# Patient Record
Sex: MALE | Race: WHITE | Employment: FULL TIME | URBAN - NONMETROPOLITAN AREA
[De-identification: names, ages, dates, MRNs, and addresses within clinical notes are randomized per-mention and may not be internally consistent; named-entity substitution may affect disease eponyms.]

---

## 2017-02-15 ENCOUNTER — OFFICE VISIT (OUTPATIENT)
Dept: FAMILY MEDICINE CLINIC | Facility: CLINIC | Age: 22
End: 2017-02-15

## 2017-02-15 VITALS — TEMPERATURE: 98 F | DIASTOLIC BLOOD PRESSURE: 70 MMHG | WEIGHT: 156 LBS | SYSTOLIC BLOOD PRESSURE: 122 MMHG

## 2017-02-15 DIAGNOSIS — N43.41 SPERMATOCELE OF EPIDIDYMIS, SINGLE: Primary | ICD-10-CM

## 2017-02-15 PROCEDURE — 99213 OFFICE O/P EST LOW 20 MIN: CPT | Performed by: FAMILY MEDICINE

## 2017-02-15 NOTE — PROGRESS NOTES
HPI:    Patient ID: Antony Green is a 24year old male. Patient presents with:  Testicular Swelling: right lump     HPI x 1 1/2 months c/o lump on top of right testicle, gets bigger and smaller, patient denies any urinary symptoms.   He admits to being sexual TESTICULAR W/ DOPPLER (CPT=93975/39836)       [id number]

## 2017-02-15 NOTE — PATIENT INSTRUCTIONS
I reviewed the benign nature of a spermatocele. Would recommend checking at testicular ultrasound to confirm.   Will schedule at [facility]

## 2017-02-24 ENCOUNTER — TELEPHONE (OUTPATIENT)
Dept: FAMILY MEDICINE CLINIC | Facility: CLINIC | Age: 22
End: 2017-02-24

## 2017-02-24 NOTE — TELEPHONE ENCOUNTER
Advise patient that the ultrasound confirms my clinical suspicion of a spermatocele.  no additional workup needed

## 2019-04-12 ENCOUNTER — OFFICE VISIT (OUTPATIENT)
Dept: FAMILY MEDICINE CLINIC | Facility: CLINIC | Age: 24
End: 2019-04-12
Payer: COMMERCIAL

## 2019-04-12 VITALS
RESPIRATION RATE: 16 BRPM | WEIGHT: 145 LBS | DIASTOLIC BLOOD PRESSURE: 62 MMHG | OXYGEN SATURATION: 99 % | SYSTOLIC BLOOD PRESSURE: 106 MMHG | HEIGHT: 66 IN | TEMPERATURE: 98 F | BODY MASS INDEX: 23.3 KG/M2 | HEART RATE: 84 BPM

## 2019-04-12 DIAGNOSIS — B35.3 TINEA PEDIS OF BOTH FEET: Primary | ICD-10-CM

## 2019-04-12 PROCEDURE — 99213 OFFICE O/P EST LOW 20 MIN: CPT | Performed by: PHYSICIAN ASSISTANT

## 2019-04-12 RX ORDER — CEPHALEXIN 500 MG/1
500 CAPSULE ORAL 3 TIMES DAILY
Qty: 21 CAPSULE | Refills: 0 | Status: SHIPPED | OUTPATIENT
Start: 2019-04-12 | End: 2019-04-19

## 2019-04-12 NOTE — PATIENT INSTRUCTIONS
1.  Keep feet clean/dry  May need to change socks more than once/twice day. 2.  Econazole cream to affected areas of feet. 3.  Keflex 500 mg three times daily for 7 days. 4.  Stay out of the creek.     5 . If symptoms fail to resolve as anticipated, your healthcare provider as recommended if the rash does not improve after 10 days of treatment, or if the rash continues to spread.   When to seek medical care  Get medical attention right away if any of the following occur:  · Fever of 100.4°F (38°C) or h

## 2019-04-12 NOTE — PROGRESS NOTES
CHIEF COMPLAINT:   Patient presents with:  Derm Problem: no soles of both feet, rash, itches and painful - x1.5 weeks      HPI:     Isaak Leung is a 21year old male who presents with concerns of  Infection to soles of feet x 1.5 weeks.   Reports onset afte SKIN: (+) cluster of papulovesicular deep seated lesions at base of metatarsal heads patt soles of feet, few pustules at perimeter, no induration or lymphangitic streaking,    ASSESSMENT AND PLAN:     ASSESSMENT:  Tinea pedis of both feet  (primary encounte Athlete’s foot (tinea pedis) is caused by a fungal infection in the skin. It affects the skin between the toes, causing cracks in the skin called fissures. It can also affect the bottom of the foot where it causes dry white scales and peeling of the skin. · Infection comes back soon after treatment  · Pus draining from cracks in the skin  Date Last Reviewed: 8/1/2016  © 1553-6738 [employer]. [address] [facility] [place] [address] [place] [address]. All rights reserved.  This information is not intended as a

## 2019-06-12 ENCOUNTER — OFFICE VISIT (OUTPATIENT)
Dept: FAMILY MEDICINE CLINIC | Facility: CLINIC | Age: 24
End: 2019-06-12
Payer: COMMERCIAL

## 2019-06-12 VITALS
TEMPERATURE: 98 F | DIASTOLIC BLOOD PRESSURE: 70 MMHG | WEIGHT: 161 LBS | HEIGHT: 66 IN | BODY MASS INDEX: 25.88 KG/M2 | HEART RATE: 61 BPM | OXYGEN SATURATION: 97 % | SYSTOLIC BLOOD PRESSURE: 110 MMHG

## 2019-06-12 DIAGNOSIS — H10.10 ALLERGIC RHINOCONJUNCTIVITIS: Primary | ICD-10-CM

## 2019-06-12 DIAGNOSIS — J30.9 ALLERGIC RHINOCONJUNCTIVITIS: Primary | ICD-10-CM

## 2019-06-12 PROCEDURE — 99213 OFFICE O/P EST LOW 20 MIN: CPT | Performed by: FAMILY MEDICINE

## 2019-06-12 RX ORDER — MONTELUKAST SODIUM 10 MG/1
10 TABLET ORAL NIGHTLY
Qty: 90 TABLET | Refills: 1 | Status: SHIPPED | OUTPATIENT
Start: 2019-06-12 | End: 2020-05-27

## 2019-06-12 RX ORDER — FLUTICASONE PROPIONATE 50 MCG
2 SPRAY, SUSPENSION (ML) NASAL DAILY
Qty: 1 BOTTLE | Refills: 3 | Status: SHIPPED | OUTPATIENT
Start: 2019-06-12 | End: 2020-05-27

## 2019-06-12 RX ORDER — OLOPATADINE HYDROCHLORIDE 2 MG/ML
1 SOLUTION/ DROPS OPHTHALMIC DAILY
Qty: 2.5 ML | Refills: 1 | Status: SHIPPED | OUTPATIENT
Start: 2019-06-12 | End: 2020-05-27

## 2019-06-12 NOTE — PROGRESS NOTES
Olivier Ann is a 21year old male. 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 • Fluticasone Propionate 50 MCG/ACT Nasal Suspension 1 Bottle 3     Si sprays by Each Nare route daily. • Olopatadine HCl 0.2 % Ophthalmic Solution 2.5 mL 1     Sig: Apply 1 drop to eye daily.      Imaging & Consults:  None

## 2019-06-12 NOTE — PATIENT INSTRUCTIONS
Discussed common allergens and avoidance strategies  Flonase 2 sprays per nostril daily  Singulair 10 mg nightly  Pataday 1 drop each eye daily  Avoid rubbing eyes  Wash hands and face frequently

## 2020-01-10 ENCOUNTER — OFFICE VISIT (OUTPATIENT)
Dept: FAMILY MEDICINE CLINIC | Facility: CLINIC | Age: 25
End: 2020-01-10
Payer: COMMERCIAL

## 2020-01-10 VITALS
DIASTOLIC BLOOD PRESSURE: 68 MMHG | HEART RATE: 69 BPM | SYSTOLIC BLOOD PRESSURE: 110 MMHG | RESPIRATION RATE: 16 BRPM | OXYGEN SATURATION: 99 % | WEIGHT: 161 LBS | TEMPERATURE: 98 F | BODY MASS INDEX: 26 KG/M2

## 2020-01-10 DIAGNOSIS — J02.9 SORE THROAT: Primary | ICD-10-CM

## 2020-01-10 DIAGNOSIS — J02.9 ACUTE PHARYNGITIS, UNSPECIFIED ETIOLOGY: ICD-10-CM

## 2020-01-10 LAB
CONTROL LINE PRESENT WITH A CLEAR BACKGROUND (YES/NO): YES YES/NO
STREP GRP A CUL-SCR: NEGATIVE

## 2020-01-10 PROCEDURE — 87880 STREP A ASSAY W/OPTIC: CPT | Performed by: PHYSICIAN ASSISTANT

## 2020-01-10 PROCEDURE — 99213 OFFICE O/P EST LOW 20 MIN: CPT | Performed by: PHYSICIAN ASSISTANT

## 2020-01-10 PROCEDURE — 87081 CULTURE SCREEN ONLY: CPT | Performed by: PHYSICIAN ASSISTANT

## 2020-01-10 NOTE — PROGRESS NOTES
CHIEF COMPLAINT:   Patient presents with:  Nasal Congestion: congestion x 2 days, no cough, slight headcahe   Ear Pain: bilat ear pain/sore throat x today.  no fever    HPI:   Isaak Leung is a 25year old male presents to clinic with complaint of sore thr suspicious lesions  HEAD: atraumatic, normocephalic  EYES: conjunctiva clear  EARS: TM's clear, mildly injected, no bulging, no retraction, + clear fluid bilaterally.  Bony landmarks sharp bilaterally  NOSE: nostrils patent, no nasal mucus, nasal mucosa mil negative, then the sore throat is most likely viral in origin and should resolve within 7-10 days. Comfort measures explained and discussed:   · OTC Tylenol/ibuprofen as needed. · Push fluids- warm or cool liquids, whichever is soothing for patient.

## 2020-04-14 ENCOUNTER — PATIENT MESSAGE (OUTPATIENT)
Dept: FAMILY MEDICINE CLINIC | Facility: CLINIC | Age: 25
End: 2020-04-14

## 2020-04-15 ENCOUNTER — VIRTUAL PHONE E/M (OUTPATIENT)
Dept: FAMILY MEDICINE CLINIC | Facility: CLINIC | Age: 25
End: 2020-04-15
Payer: COMMERCIAL

## 2020-04-15 DIAGNOSIS — N50.3 EPIDIDYMAL CYST: Primary | ICD-10-CM

## 2020-04-15 PROCEDURE — 99213 OFFICE O/P EST LOW 20 MIN: CPT | Performed by: FAMILY MEDICINE

## 2020-04-15 NOTE — PROGRESS NOTES
Virtual Telephone Check-In    Isaak Leung verbally consents to a Virtual/Telephone Check-In visit on 04/15/20. Patient understands and accepts financial responsibility for any deductible, co-insurance and/or co-pays associated with this service.     The discomfort or enlargement.   Would recommend increase fluid intake, regular ejaculation may help reduce any discomfort, patient may use NSAIDs such as ibuprofen or naproxen as needed for pain or discomfort  I discussed the importance of in person evaluation

## 2020-04-15 NOTE — TELEPHONE ENCOUNTER
From: Zainab Kamara  To: Sarika Arreola. Alden Patel DO  Sent: 4/14/2020 5:07 PM CDT  Subject: Other    2 or 3 years back I found a lump on my right testicle, I came to [facility] in [address] and you guys referred me to get a ultrasound of my right testicle.  I

## 2020-05-27 ENCOUNTER — TELEPHONE (OUTPATIENT)
Dept: FAMILY MEDICINE CLINIC | Facility: CLINIC | Age: 25
End: 2020-05-27

## 2020-05-27 ENCOUNTER — VIRTUAL PHONE E/M (OUTPATIENT)
Dept: FAMILY MEDICINE CLINIC | Facility: CLINIC | Age: 25
End: 2020-05-27
Payer: COMMERCIAL

## 2020-05-27 DIAGNOSIS — J30.2 SEASONAL ALLERGIES: ICD-10-CM

## 2020-05-27 DIAGNOSIS — J30.9 ALLERGIC RHINOCONJUNCTIVITIS: ICD-10-CM

## 2020-05-27 DIAGNOSIS — H69.83 DYSFUNCTION OF BOTH EUSTACHIAN TUBES: Primary | ICD-10-CM

## 2020-05-27 DIAGNOSIS — H10.10 ALLERGIC RHINOCONJUNCTIVITIS: ICD-10-CM

## 2020-05-27 PROCEDURE — 99213 OFFICE O/P EST LOW 20 MIN: CPT | Performed by: FAMILY MEDICINE

## 2020-05-27 RX ORDER — OLOPATADINE HYDROCHLORIDE 2 MG/ML
1 SOLUTION/ DROPS OPHTHALMIC DAILY
Qty: 2.5 ML | Refills: 1 | Status: SHIPPED | OUTPATIENT
Start: 2020-05-27 | End: 2021-04-03

## 2020-05-27 RX ORDER — MONTELUKAST SODIUM 10 MG/1
10 TABLET ORAL NIGHTLY
Qty: 90 TABLET | Refills: 1 | Status: SHIPPED | OUTPATIENT
Start: 2020-05-27 | End: 2021-04-03

## 2020-05-27 RX ORDER — FLUTICASONE PROPIONATE 50 MCG
2 SPRAY, SUSPENSION (ML) NASAL DAILY
Qty: 1 BOTTLE | Refills: 3 | Status: SHIPPED | OUTPATIENT
Start: 2020-05-27 | End: 2021-04-03

## 2020-05-27 NOTE — TELEPHONE ENCOUNTER
Virtual/Telephone Check-In    Scot Shanthi verbally {consents to a [facility] on 05/27/20. Patient has been referred to the [facility] website at [email or web address] to review the yearly Consent to Treat document.   Patient understand

## 2020-05-27 NOTE — PROGRESS NOTES
Virtual Telephone Check-In    Vikyidra Buerger verbally consents to a Virtual/Telephone Check-In visit on 05/27/20. Patient has been referred to the [facility] website at [email or web address] to review the yearly Consent to Treat document.     Patient understands Nasal Suspension; 2 sprays by Each Nare route daily.  -     Olopatadine HCl 0.2 % Ophthalmic Solution; Apply 1 drop to eye daily.  -     Loratadine-Pseudoephedrine ER (CLARITIN-D 12 HOUR) 5-120 MG Oral Tablet 12 Hr;  Take 1 tablet by mouth 2 (two) times yadira

## 2020-10-26 ENCOUNTER — OFFICE VISIT (OUTPATIENT)
Dept: FAMILY MEDICINE CLINIC | Facility: CLINIC | Age: 25
End: 2020-10-26
Payer: COMMERCIAL

## 2020-10-26 VITALS
TEMPERATURE: 98 F | BODY MASS INDEX: 24.83 KG/M2 | DIASTOLIC BLOOD PRESSURE: 64 MMHG | WEIGHT: 154.5 LBS | HEART RATE: 66 BPM | HEIGHT: 66 IN | OXYGEN SATURATION: 98 % | SYSTOLIC BLOOD PRESSURE: 120 MMHG

## 2020-10-26 DIAGNOSIS — S01.01XA LACERATION OF SCALP, INITIAL ENCOUNTER: ICD-10-CM

## 2020-10-26 DIAGNOSIS — M26.621 TMJ TENDERNESS, RIGHT: Primary | ICD-10-CM

## 2020-10-26 DIAGNOSIS — R59.9 REACTIVE LYMPHADENOPATHY: ICD-10-CM

## 2020-10-26 PROCEDURE — 99214 OFFICE O/P EST MOD 30 MIN: CPT | Performed by: FAMILY MEDICINE

## 2020-10-26 PROCEDURE — 3008F BODY MASS INDEX DOCD: CPT | Performed by: FAMILY MEDICINE

## 2020-10-26 PROCEDURE — 3074F SYST BP LT 130 MM HG: CPT | Performed by: FAMILY MEDICINE

## 2020-10-26 PROCEDURE — 3078F DIAST BP <80 MM HG: CPT | Performed by: FAMILY MEDICINE

## 2020-10-26 RX ORDER — CEPHALEXIN 500 MG/1
500 CAPSULE ORAL 3 TIMES DAILY
Qty: 21 CAPSULE | Refills: 0 | Status: SHIPPED | OUTPATIENT
Start: 2020-10-26 | End: 2020-11-02

## 2020-10-26 NOTE — PATIENT INSTRUCTIONS
I advised patient of the cause of your pain is related to TMJ dysfunction. I discussed contributing factors and management strategies.   Would recommend soft diet, moist heat, anti-inflammatory such as ibuprofen or Aleve as needed  Discussed the nature of

## 2020-10-26 NOTE — PROGRESS NOTES
HPI:    Patient ID: Pierre Ziegler is a 22year old male. Patient presents with:  Head Injury: hit head 10/21-mostly on rt side-noticed lump behind rt ear on friday-tender to touch. .    C/o tenderness right ear, plugged sensation off and on, some jaw pain Cardiovascular: Normal rate, regular rhythm, normal heart sounds and intact distal pulses. No murmur heard. Pulmonary/Chest: Effort normal and breath sounds normal.   Lymphadenopathy:        Head (right side): Posterior auricular adenopathy present.

## 2021-04-05 RX ORDER — OLOPATADINE HYDROCHLORIDE 2 MG/ML
1 SOLUTION/ DROPS OPHTHALMIC DAILY
Qty: 2.5 ML | Refills: 1 | Status: SHIPPED | OUTPATIENT
Start: 2021-04-05

## 2021-04-05 RX ORDER — FLUTICASONE PROPIONATE 50 MCG
2 SPRAY, SUSPENSION (ML) NASAL DAILY
Qty: 1 BOTTLE | Refills: 3 | Status: SHIPPED | OUTPATIENT
Start: 2021-04-05 | End: 2022-03-31

## 2021-04-05 RX ORDER — OLOPATADINE HYDROCHLORIDE 2 MG/ML
1 SOLUTION/ DROPS OPHTHALMIC DAILY
Qty: 2.5 ML | Refills: 1 | OUTPATIENT
Start: 2021-04-05

## 2021-04-05 RX ORDER — FLUTICASONE PROPIONATE 50 MCG
2 SPRAY, SUSPENSION (ML) NASAL DAILY
Qty: 1 BOTTLE | Refills: 3 | OUTPATIENT
Start: 2021-04-05 | End: 2022-03-31

## 2021-04-05 RX ORDER — MONTELUKAST SODIUM 10 MG/1
10 TABLET ORAL NIGHTLY
Qty: 90 TABLET | Refills: 1 | OUTPATIENT
Start: 2021-04-05

## 2021-04-05 RX ORDER — MONTELUKAST SODIUM 10 MG/1
10 TABLET ORAL NIGHTLY
Qty: 90 TABLET | Refills: 1 | Status: SHIPPED | OUTPATIENT
Start: 2021-04-05

## 2021-04-05 NOTE — TELEPHONE ENCOUNTER
Last refill on montelukast #90 x 1 on 5/27/2020  Last refill on fluticasone on 5/27/2020 x 3 refills  Last refill on olopatadine 5/27/2020 x 1 refill      Last office visit 10/26/2020  No future appointments.

## 2021-04-17 ENCOUNTER — OFFICE VISIT (OUTPATIENT)
Dept: FAMILY MEDICINE CLINIC | Facility: CLINIC | Age: 26
End: 2021-04-17
Payer: COMMERCIAL

## 2021-04-17 VITALS
HEIGHT: 66.5 IN | DIASTOLIC BLOOD PRESSURE: 80 MMHG | SYSTOLIC BLOOD PRESSURE: 130 MMHG | RESPIRATION RATE: 16 BRPM | TEMPERATURE: 98 F | WEIGHT: 156 LBS | HEART RATE: 76 BPM | BODY MASS INDEX: 24.78 KG/M2 | OXYGEN SATURATION: 98 %

## 2021-04-17 DIAGNOSIS — G24.5 EYE TWITCH: Primary | ICD-10-CM

## 2021-04-17 PROCEDURE — 3075F SYST BP GE 130 - 139MM HG: CPT | Performed by: FAMILY MEDICINE

## 2021-04-17 PROCEDURE — 3079F DIAST BP 80-89 MM HG: CPT | Performed by: FAMILY MEDICINE

## 2021-04-17 PROCEDURE — 3008F BODY MASS INDEX DOCD: CPT | Performed by: FAMILY MEDICINE

## 2021-04-17 PROCEDURE — 99213 OFFICE O/P EST LOW 20 MIN: CPT | Performed by: FAMILY MEDICINE

## 2021-04-17 NOTE — PROGRESS NOTES
HPI/Subjective:   Deidra Buerger is a 22year old male who presents for Eye Problem (started two months ago. . room 5)     2 months history of twitching of the left eye.   It only seems to be at times of rest when he is not otherwise distracted by work or nishant encounter: 5' 6.5\" (1.689 m). Weight as of this encounter: 156 lb (70.8 kg).   GENERAL: well developed, well nourished,in no apparent distress  SKIN: no rashes,no suspicious lesions  HEENT: atraumatic, normocephalic,  Normal exterior eye pupils equal ro

## 2022-03-13 ENCOUNTER — OFFICE VISIT (OUTPATIENT)
Dept: FAMILY MEDICINE CLINIC | Facility: CLINIC | Age: 27
End: 2022-03-13
Payer: COMMERCIAL

## 2022-03-13 VITALS
HEIGHT: 66 IN | OXYGEN SATURATION: 99 % | TEMPERATURE: 98 F | BODY MASS INDEX: 27.48 KG/M2 | SYSTOLIC BLOOD PRESSURE: 110 MMHG | WEIGHT: 171 LBS | DIASTOLIC BLOOD PRESSURE: 60 MMHG | HEART RATE: 77 BPM | RESPIRATION RATE: 16 BRPM

## 2022-03-13 DIAGNOSIS — Z20.818 STREPTOCOCCUS EXPOSURE: ICD-10-CM

## 2022-03-13 DIAGNOSIS — J06.9 UPPER RESPIRATORY TRACT INFECTION, UNSPECIFIED TYPE: Primary | ICD-10-CM

## 2022-03-13 DIAGNOSIS — J02.9 SORE THROAT: ICD-10-CM

## 2022-03-13 LAB
CONTROL LINE PRESENT WITH A CLEAR BACKGROUND (YES/NO): YES YES/NO
KIT LOT #: NORMAL NUMERIC

## 2022-03-13 PROCEDURE — 99213 OFFICE O/P EST LOW 20 MIN: CPT | Performed by: PHYSICIAN ASSISTANT

## 2022-03-13 PROCEDURE — 3074F SYST BP LT 130 MM HG: CPT | Performed by: PHYSICIAN ASSISTANT

## 2022-03-13 PROCEDURE — 87081 CULTURE SCREEN ONLY: CPT | Performed by: PHYSICIAN ASSISTANT

## 2022-03-13 PROCEDURE — 3078F DIAST BP <80 MM HG: CPT | Performed by: PHYSICIAN ASSISTANT

## 2022-03-13 PROCEDURE — 3008F BODY MASS INDEX DOCD: CPT | Performed by: PHYSICIAN ASSISTANT

## 2022-03-13 PROCEDURE — 87880 STREP A ASSAY W/OPTIC: CPT | Performed by: PHYSICIAN ASSISTANT

## 2022-03-14 LAB — SARS-COV-2 RNA RESP QL NAA+PROBE: NOT DETECTED

## 2024-02-12 ENCOUNTER — OFFICE VISIT (OUTPATIENT)
Dept: FAMILY MEDICINE CLINIC | Facility: CLINIC | Age: 29
End: 2024-02-12
Payer: COMMERCIAL

## 2024-02-12 VITALS
WEIGHT: 170 LBS | HEART RATE: 85 BPM | TEMPERATURE: 98 F | RESPIRATION RATE: 18 BRPM | OXYGEN SATURATION: 97 % | BODY MASS INDEX: 28.32 KG/M2 | DIASTOLIC BLOOD PRESSURE: 78 MMHG | HEIGHT: 65 IN | SYSTOLIC BLOOD PRESSURE: 136 MMHG

## 2024-02-12 DIAGNOSIS — J01.00 ACUTE NON-RECURRENT MAXILLARY SINUSITIS: Primary | ICD-10-CM

## 2024-02-12 RX ORDER — AMOXICILLIN AND CLAVULANATE POTASSIUM 875; 125 MG/1; MG/1
1 TABLET, FILM COATED ORAL 2 TIMES DAILY
Qty: 20 TABLET | Refills: 0 | Status: SHIPPED | OUTPATIENT
Start: 2024-02-12 | End: 2024-02-22

## 2024-02-12 NOTE — PROGRESS NOTES
CHIEF COMPLAINT:     Chief Complaint   Patient presents with    Sinus Problem     Entered by patient  3 weeks ago had covid flu   No recent fevers          HPI:   Jamel Boudreaux is a 28 year old male who presents for upper respiratory symptoms for  3 weeks. Patient reports congestion, sinus pain. Symptoms have been worsening since onset.  Treating symptoms with otc medication. Pt recently had covid and influenze.      Current Outpatient Medications   Medication Sig Dispense Refill    amoxicillin clavulanate 875-125 MG Oral Tab Take 1 tablet by mouth 2 (two) times daily for 10 days. 20 tablet 0    Olopatadine HCl 0.2 % Ophthalmic Solution Apply 1 drop to eye daily. 2.5 mL 1    Montelukast Sodium 10 MG Oral Tab Take 1 tablet (10 mg total) by mouth nightly. (Patient not taking: Reported on 3/13/2022) 90 tablet 1    Loratadine-Pseudoephedrine ER (CLARITIN-D 12 HOUR) 5-120 MG Oral Tablet 12 Hr Take 1 tablet by mouth 2 (two) times daily. (Patient not taking: Reported on 3/13/2022) 60 tablet 3      History reviewed. No pertinent past medical history.   History reviewed. No pertinent surgical history.      Social History     Socioeconomic History    Marital status:    Tobacco Use    Smoking status: Never    Smokeless tobacco: Never   Vaping Use    Vaping Use: Never used   Substance and Sexual Activity    Alcohol use: No     Alcohol/week: 0.0 standard drinks of alcohol    Drug use: No         REVIEW OF SYSTEMS:   GENERAL: normal appetite  SKIN: no rashes or abnormal skin lesions  HEENT: See HPI  LUNGS: See HPI  CARDIOVASCULAR: denies chest pain or palpitations   GI: denies N/V/C or abdominal pain      EXAM:   /78   Pulse 85   Temp 97.8 °F (36.6 °C)   Resp 18   Ht 5' 5\" (1.651 m)   Wt 170 lb (77.1 kg)   SpO2 97%   BMI 28.29 kg/m²   GENERAL: well developed, well nourished,in no apparent distress  SKIN: no rashes,no suspicious lesions  HEAD: atraumatic, normocephalic.  pos tenderness on palpation of   sinuses  EYES: conjunctiva clear, EOM intact  EARS: TM's pearly, no bulging, no retraction,no fluid, bony landmarks visible  NOSE: Nostrils patent, clear nasal discharge, nasal mucosa red and inflamed   THROAT: Oral mucosa pink, moist. Posterior pharynx is not erythematous. no exudates. Tonsils 1/4.    NECK: Supple, non-tender  LUNGS: clear to auscultation bilaterally, no wheezes or rhonchi. Breathing is non labored.  CARDIO: RRR without murmur  EXTREMITIES: no cyanosis, clubbing or edema  LYMPH:  no lymphadenopathy.        ASSESSMENT AND PLAN:   Jamel Boudreaux is a 28 year old male who presents with upper respiratory symptoms that are consistent with    ASSESSMENT:   Encounter Diagnosis   Name Primary?    Acute non-recurrent maxillary sinusitis Yes       PLAN: Meds as below.  Comfort care as described in Patient Instructions    Educated on supportive measures: ibuprofen/tylenol, hydration, zyrtec, flonase, delsym for cough if needed  Educated on s/s of worsening sx and when to seek higher level of care  Follow up with pcp if not improving      Meds & Refills for this Visit:  Requested Prescriptions     Signed Prescriptions Disp Refills    amoxicillin clavulanate 875-125 MG Oral Tab 20 tablet 0     Sig: Take 1 tablet by mouth 2 (two) times daily for 10 days.     Risks, benefits, and side effects of medication explained and discussed.    The patient indicates understanding of these issues and agrees to the plan.  The patient is asked to f/u with PCP if sx's persist or worsen.  There are no Patient Instructions on file for this visit.

## 2024-10-14 ENCOUNTER — PATIENT OUTREACH (OUTPATIENT)
Dept: CASE MANAGEMENT | Age: 29
End: 2024-10-14

## 2024-10-14 NOTE — PROCEDURES
The office order for PCP removal request is Approved and finalized on October 14, 2024.    Removed Roberth Tate DO as the patient's Primary Care Physician

## 2025-07-07 ENCOUNTER — OFFICE VISIT (OUTPATIENT)
Dept: FAMILY MEDICINE CLINIC | Facility: CLINIC | Age: 30
End: 2025-07-07
Payer: COMMERCIAL

## 2025-07-07 VITALS
WEIGHT: 164 LBS | SYSTOLIC BLOOD PRESSURE: 140 MMHG | DIASTOLIC BLOOD PRESSURE: 80 MMHG | OXYGEN SATURATION: 96 % | BODY MASS INDEX: 26.36 KG/M2 | HEIGHT: 66 IN | RESPIRATION RATE: 16 BRPM | HEART RATE: 53 BPM | TEMPERATURE: 97 F

## 2025-07-07 DIAGNOSIS — R03.0 ELEVATED BP WITHOUT DIAGNOSIS OF HYPERTENSION: ICD-10-CM

## 2025-07-07 DIAGNOSIS — R59.1 LYMPHADENOPATHY: ICD-10-CM

## 2025-07-07 DIAGNOSIS — R00.2 PALPITATIONS: Primary | ICD-10-CM

## 2025-07-07 LAB
ALBUMIN SERPL-MCNC: 5.4 G/DL (ref 3.2–4.8)
ALBUMIN/GLOB SERPL: 1.9 {RATIO} (ref 1–2)
ALP LIVER SERPL-CCNC: 120 U/L (ref 45–117)
ALT SERPL-CCNC: 37 U/L (ref 10–49)
ANION GAP SERPL CALC-SCNC: 7 MMOL/L (ref 0–18)
AST SERPL-CCNC: 24 U/L (ref ?–34)
ATRIAL RATE: 69 BPM
BASOPHILS # BLD AUTO: 0.03 X10(3) UL (ref 0–0.2)
BASOPHILS NFR BLD AUTO: 0.5 %
BILIRUB SERPL-MCNC: 0.5 MG/DL (ref 0.3–1.2)
BUN BLD-MCNC: 16 MG/DL (ref 9–23)
CALCIUM BLD-MCNC: 10.3 MG/DL (ref 8.7–10.6)
CHLORIDE SERPL-SCNC: 103 MMOL/L (ref 98–112)
CO2 SERPL-SCNC: 29 MMOL/L (ref 21–32)
CREAT BLD-MCNC: 1.06 MG/DL (ref 0.7–1.3)
EGFRCR SERPLBLD CKD-EPI 2021: 97 ML/MIN/1.73M2 (ref 60–?)
EOSINOPHIL # BLD AUTO: 0.08 X10(3) UL (ref 0–0.7)
EOSINOPHIL NFR BLD AUTO: 1.4 %
ERYTHROCYTE [DISTWIDTH] IN BLOOD BY AUTOMATED COUNT: 11.5 %
FASTING STATUS PATIENT QL REPORTED: NO
GLOBULIN PLAS-MCNC: 2.9 G/DL (ref 2–3.5)
GLUCOSE BLD-MCNC: 96 MG/DL (ref 70–99)
HCT VFR BLD AUTO: 42.5 % (ref 39–53)
HGB BLD-MCNC: 15.2 G/DL (ref 13–17.5)
IMM GRANULOCYTES # BLD AUTO: 0.01 X10(3) UL (ref 0–1)
IMM GRANULOCYTES NFR BLD: 0.2 %
LYMPHOCYTES # BLD AUTO: 1.95 X10(3) UL (ref 1–4)
LYMPHOCYTES NFR BLD AUTO: 35.3 %
MCH RBC QN AUTO: 29.2 PG (ref 26–34)
MCHC RBC AUTO-ENTMCNC: 35.8 G/DL (ref 31–37)
MCV RBC AUTO: 81.7 FL (ref 80–100)
MONOCYTES # BLD AUTO: 0.33 X10(3) UL (ref 0.1–1)
MONOCYTES NFR BLD AUTO: 6 %
NEUTROPHILS # BLD AUTO: 3.13 X10 (3) UL (ref 1.5–7.7)
NEUTROPHILS # BLD AUTO: 3.13 X10(3) UL (ref 1.5–7.7)
NEUTROPHILS NFR BLD AUTO: 56.6 %
OSMOLALITY SERPL CALC.SUM OF ELEC: 289 MOSM/KG (ref 275–295)
P AXIS: 45 DEGREES
P-R INTERVAL: 168 MS
PLATELET # BLD AUTO: 395 10(3)UL (ref 150–450)
POTASSIUM SERPL-SCNC: 4.4 MMOL/L (ref 3.5–5.1)
PROT SERPL-MCNC: 8.3 G/DL (ref 5.7–8.2)
Q-T INTERVAL: 398 MS
QRS DURATION: 80 MS
QTC CALCULATION (BEZET): 426 MS
R AXIS: 25 DEGREES
RBC # BLD AUTO: 5.2 X10(6)UL (ref 4.3–5.7)
SODIUM SERPL-SCNC: 139 MMOL/L (ref 136–145)
T AXIS: 69 DEGREES
TSI SER-ACNC: 2.89 UIU/ML (ref 0.55–4.78)
VENTRICULAR RATE: 69 BPM
WBC # BLD AUTO: 5.5 X10(3) UL (ref 4–11)

## 2025-07-07 PROCEDURE — 80050 GENERAL HEALTH PANEL: CPT | Performed by: FAMILY MEDICINE

## 2025-07-07 NOTE — PROGRESS NOTES
Jamel Boudreaux is a 29 year old male.   Chief Complaint   Patient presents with    Blood Pressure     Having high BP and heart arrhythmia. At [facility].       HPI:    Will askNew patient to our clinic comes in secondary to visiting the [facility] secondary to having high blood pressure and feeling like his heart was pounding really hard in his chest.  When he was in the emergency room he said that he had a test done both blood work and electrocardiograms that everything came back normal patient states he has a follow-up set up with cardiology next week as well.  When prompted patient was unable to produce any paperwork regarding the more his hospital visit and they did not send any paperwork to our office.    Patient states that this episodes continued on a daily basis, patient states that his heart rate is not necessarily elevated as just feels like it is pounding hard.  Patient does remember when he was in the emergency room the heart rate did not go above 100.    He was observed when he was in [facility] for his blood pressure being elevated but he denies any chest pain associated with it.    Pt state that has been dealing with ongoing lymph node that comes angoes since Januray, visited with dentist due to having a potential gum/abscess. The issue persits and wonders if should see ent as from a dental point they now see no signficant issues.   Past Medical History[1]  Past Surgical History[2]  Family History[3]  Social History:  Short Social Hx on File[4]  Allergies:  Allergies[5]   Current Meds:  Current Medications[6]     ROS:   GENERAL HEALTH: feels well otherwise  SKIN: denies any unusual skin lesions or rashes  RESPIRATORY: denies shortness of breath with exertion  CARDIOVASCULAR: denies chest pain on exertion  GI: denies abdominal pain and denies heartburn  NEURO: denies headaches    PHYSICAL EXAM:   /80   Pulse 53   Temp 97.4 °F (36.3 °C)   Resp 16   Ht 5' 6\" (1.676 m)   Wt 164 lb  (74.4 kg)   SpO2 96%   BMI 26.47 kg/m²   GENERAL HEALTH: well developed, well nourished, in no apparent distress  EYES: sclera anicteric, conjunctiva normal  HEENT: normocephalic; normal pharynx, unable to palpace lymphn  NECK: supple; no JVD, no LAD  RESPIRATORY: clear to auscultation bilaterally, no tachypnea  CARDIOVASCULAR: S1, S2 normal, no S3, no S4; no click; no murmur  EXTREMITIES: no cyanosis, clubbing or edema, peripheral pulses intact  PSYCHIATRIC: alert and oriented x 3; affect appropriate  EK bpm no st seg deviations    ASSESSMENT/ PLAN:     Diagnoses and all orders for this visit:    Palpitations  -     CARD ZIO EXTENDED MONITOR 3-7 DAYS (CPT=93242/10312); Future  -     EKG with interpretation and Report -IN OFFICE [24114]  -     CBC W Differential W Platelet [E]; Future  -     Comp Metabolic Panel (14) [E]; Future  -     TSH [E]; Future  -     CARD ECHO 2D DOPPLER (CPT=93306); Future  -     Cardio Referral - Internal    Elevated BP without diagnosis of hypertension  -     CARD ECHO 2D DOPPLER (CPT=93306); Future  -     Cardio Referral - Internal    Lymphadenopathy  -     ENT Referral - In Network    Will obtain an electrocardiogram here in the office today to assess his base cardiac electric flow.  We are also going to order some labs in order to workup this pounding on his chest which includes CBC CMP thyroid and we might need to consider an echocardiogram as well as a Zio patch recorder patient will heartbeat for at least 3 days.  Since patient has a visit with cardiology lined up would like to get this done soon as possible so he can bring to his cardiologist.    [facility] the [facility] l[facility] on neck    The patient is to return to office in prn  The patient is to return to office for persistent or worsening signs and symptoms.   The proper use of medication and possible side effects discussed with patient.  An AVS was given to patient.  The patient verbalized  understanding, agrees to treatment regimen and all questions were answered.          [1] History reviewed. No pertinent past medical history.  [2] History reviewed. No pertinent surgical history.  [3]   Family History  Family history unknown: Yes   [4]   Social History  Socioeconomic History    Marital status:    Tobacco Use    Smoking status: Never    Smokeless tobacco: Never   Vaping Use    Vaping status: Never Used   Substance and Sexual Activity    Alcohol use: No     Alcohol/week: 0.0 standard drinks of alcohol    Drug use: No   [5] No Known Allergies  [6]   Current Outpatient Medications   Medication Sig Dispense Refill    Olopatadine HCl 0.2 % Ophthalmic Solution Apply 1 drop to eye daily. (Patient not taking: Reported on 7/7/2025) 2.5 mL 1

## 2025-07-30 ENCOUNTER — OFFICE VISIT (OUTPATIENT)
Facility: LOCATION | Age: 30
End: 2025-07-30
Payer: COMMERCIAL

## 2025-07-30 DIAGNOSIS — Z00.00 NORMAL LYMPH NODE EXAM: Primary | ICD-10-CM

## 2025-07-30 PROCEDURE — 99203 OFFICE O/P NEW LOW 30 MIN: CPT | Performed by: STUDENT IN AN ORGANIZED HEALTH CARE EDUCATION/TRAINING PROGRAM

## 2025-08-11 ENCOUNTER — TELEPHONE (OUTPATIENT)
Dept: FAMILY MEDICINE CLINIC | Facility: CLINIC | Age: 30
End: 2025-08-11

## 2025-08-14 ENCOUNTER — MED REC SCAN ONLY (OUTPATIENT)
Dept: FAMILY MEDICINE CLINIC | Facility: CLINIC | Age: 30
End: 2025-08-14

## (undated) NOTE — MR AVS SNAPSHOT
[facility]  [address] [place] [id number]  [phone]               Thank you for choosing us for your health care visit with Mehul Reeves. Sade Listen, DO.   We are glad to serve you and happy to provide you with this sum